# Patient Record
Sex: MALE | Race: AMERICAN INDIAN OR ALASKA NATIVE
[De-identification: names, ages, dates, MRNs, and addresses within clinical notes are randomized per-mention and may not be internally consistent; named-entity substitution may affect disease eponyms.]

---

## 2018-03-26 ENCOUNTER — HOSPITAL ENCOUNTER (EMERGENCY)
Dept: HOSPITAL 5 - ED | Age: 33
Discharge: HOME | End: 2018-03-26
Payer: SELF-PAY

## 2018-03-26 VITALS — DIASTOLIC BLOOD PRESSURE: 84 MMHG | SYSTOLIC BLOOD PRESSURE: 135 MMHG

## 2018-03-26 DIAGNOSIS — K05.10: ICD-10-CM

## 2018-03-26 DIAGNOSIS — G89.29: ICD-10-CM

## 2018-03-26 DIAGNOSIS — F17.200: ICD-10-CM

## 2018-03-26 DIAGNOSIS — K02.9: Primary | ICD-10-CM

## 2018-03-26 PROCEDURE — 99282 EMERGENCY DEPT VISIT SF MDM: CPT

## 2018-03-26 PROCEDURE — 96372 THER/PROPH/DIAG INJ SC/IM: CPT

## 2018-03-26 NOTE — EMERGENCY DEPARTMENT REPORT
ED ENT HPI





- General


Chief complaint: Dental/Oral


Stated complaint: MOUTH PAIN SWELLING


Time Seen by Provider: 03/26/18 17:45


Source: patient


Mode of arrival: Ambulatory


Limitations: No Limitations





- History of Present Illness


Initial comments: 





This is a 33-year-old male nontoxic, well nourished in appearance, no acute 

signs of distress presents to the ED with c/o of acute on chronic toothache x1 

week. Patient denies any trauma to the region.  Patient is rest pain as aching 

with level of 8 out of 10.  Patient denies any facial swelling.  Patient denies 

any numbness, tingling, fever, chills, nausea, vomiting, headache or stiff 

neck.  Patient denies any drug allergies or significant past medical history.


MD complaint: tooth pain


-: week(s) (1)


Location: tooth #





  __________________________














  __________________________





 1 - Toothache





Severity: mild


Severity scale (0 -10): 8


Quality: aching


Consistency: constant


Improves with: none


Worsens with: none


Context- Dental: history of dental caries, poor dental care


Associated Symptoms: gum swelling, toothache.  denies: fever, cough, pain with 

swallowing, sore throat, tinnitus, hearing loss, discharge from ear, rhinorrhea





- Related Data


 Previous Rx's











 Medication  Instructions  Recorded  Last Taken  Type


 


Amoxicillin/K Clav Tab [Augmentin 1 tab PO Q12HR #20 tab 03/26/18 Unknown Rx





875 mg]    


 


Chlorhexidine Mouthwash [Peridex] 15 ml MM BID #1 bottle 03/26/18 Unknown Rx


 


Ibuprofen [Motrin] 600 mg PO Q8H PRN #30 tablet 03/26/18 Unknown Rx


 


traMADol [Ultram] 50 mg PO Q6HR PRN #15 tablet 03/26/18 Unknown Rx











 Allergies











Allergy/AdvReac Type Severity Reaction Status Date / Time


 


No Known Allergies Allergy   Unverified 03/26/18 15:29














ED Dental HPI





- General


Chief complaint: Dental/Oral


Stated complaint: MOUTH PAIN SWELLING


Time Seen by Provider: 03/26/18 17:45


Source: patient


Mode of arrival: Ambulatory


Limitations: No Limitations





- Related Data


 Previous Rx's











 Medication  Instructions  Recorded  Last Taken  Type


 


Amoxicillin/K Clav Tab [Augmentin 1 tab PO Q12HR #20 tab 03/26/18 Unknown Rx





875 mg]    


 


Chlorhexidine Mouthwash [Peridex] 15 ml MM BID #1 bottle 03/26/18 Unknown Rx


 


Ibuprofen [Motrin] 600 mg PO Q8H PRN #30 tablet 03/26/18 Unknown Rx


 


traMADol [Ultram] 50 mg PO Q6HR PRN #15 tablet 03/26/18 Unknown Rx











 Allergies











Allergy/AdvReac Type Severity Reaction Status Date / Time


 


No Known Allergies Allergy   Unverified 03/26/18 15:29














ED Review of Systems


ROS: 


Stated complaint: MOUTH PAIN SWELLING


Other details as noted in HPI





Constitutional: denies: chills, fever


Eyes: denies: eye pain, eye discharge, vision change


ENT: dental pain.  denies: ear pain, throat pain


Respiratory: denies: cough, shortness of breath, wheezing


Cardiovascular: denies: chest pain, palpitations


Endocrine: no symptoms reported


Gastrointestinal: denies: abdominal pain, nausea, diarrhea


Genitourinary: denies: urgency, dysuria


Musculoskeletal: denies: back pain, joint swelling, arthralgia


Skin: denies: rash, lesions


Neurological: denies: headache, weakness, paresthesias


Psychiatric: denies: anxiety, depression


Hematological/Lymphatic: denies: easy bleeding, easy bruising





ED Past Medical Hx





- Past Medical History


Previous Medical History?: No





- Surgical History


Past Surgical History?: No





- Social History


Smoking Status: Current Every Day Smoker


Substance Use Type: Alcohol, Non Opiate Pain





- Medications


Home Medications: 


 Home Medications











 Medication  Instructions  Recorded  Confirmed  Last Taken  Type


 


Amoxicillin/K Clav Tab [Augmentin 1 tab PO Q12HR #20 tab 03/26/18  Unknown Rx





875 mg]     


 


Chlorhexidine Mouthwash [Peridex] 15 ml MM BID #1 bottle 03/26/18  Unknown Rx


 


Ibuprofen [Motrin] 600 mg PO Q8H PRN #30 tablet 03/26/18  Unknown Rx


 


traMADol [Ultram] 50 mg PO Q6HR PRN #15 tablet 03/26/18  Unknown Rx














ED Physical Exam





- General


Limitations: No Limitations


General appearance: alert, in no apparent distress





- Head


Head exam: Present: atraumatic, normocephalic





- Eye


Eye exam: Present: normal appearance


Pupils: Present: normal accommodation





- ENT


ENT exam: Present: mucous membranes moist, TM's normal bilaterally, normal 

external ear exam





- Expanded ENT Exam


  ** Expanded


Ear exam: Present: normal external inspection


Mouth exam: Present: normal external inspection, tongue normal.  Absent: 

drooling, trismus, muffled voice, tongue elevation, laceration


Teeth exam: Present: dental caries, fractured tooth #, dental tenderness #, 

gingival enlargement, other (no facial swelling or abscess noted.)





  __________________________














  __________________________





 1 - Fractured, Dental Tenderness





Throat exam: Positive: normal inspection, other (uvula midline).  Negative: 

tonsillar erythema, tonsillomegaly, tonsillar exudate, R peritonsillar mass, L 

peritonsillar mass





- Neck


Neck exam: Present: normal inspection, full ROM.  Absent: tenderness, 

meningismus, lymphadenopathy, thyromegaly





- Respiratory


Respiratory exam: Present: normal lung sounds bilaterally.  Absent: respiratory 

distress, wheezes, rales, rhonchi, stridor, chest wall tenderness, accessory 

muscle use, decreased breath sounds, prolonged expiratory





- Cardiovascular


Cardiovascular Exam: Present: regular rate, normal rhythm, normal heart sounds.

  Absent: bradycardia, tachycardia, irregular rhythm, systolic murmur, 

diastolic murmur, rubs, gallop





- GI/Abdominal


GI/Abdominal exam: Present: soft, normal bowel sounds





- Rectal


Rectal exam: Present: deferred





- Extremities Exam


Extremities exam: Present: normal inspection





- Back Exam


Back exam: Present: normal inspection





- Neurological Exam


Neurological exam: Present: alert, oriented X3, normal gait





- Psychiatric


Psychiatric exam: Present: normal affect, normal mood





- Skin


Skin exam: Present: warm, dry, intact, normal color.  Absent: rash





ED Course


 Vital Signs











  03/26/18 03/26/18 03/26/18





  15:29 18:00 18:30


 


Temperature 99 F  


 


Pulse Rate 88  


 


Respiratory 20 16 16





Rate   


 


Blood Pressure 135/84  


 


O2 Sat by Pulse 100  





Oximetry   














- Reevaluation(s)


Reevaluation #1: 





03/26/18 17:54


Patient is speaking in full sentences with no signs of distress noted.


Critical care attestation.: 


If time is entered above; I have spent that time in minutes in the direct care 

of this critically ill patient, excluding procedure time.








ED Disposition


Clinical Impression: 


 Dental caries, Gingivitis





Disposition: DC-01 TO HOME OR SELFCARE


Is pt being admited?: No


Does the pt Need Aspirin: No


Condition: Stable


Instructions:  Amoxicillin/Clavulanate Potassium (By mouth), Tramadol (By mouth)

, Dental Caries (ED), Gingivitis (ED)


Additional Instructions: 


Follow-up with a dentist in 3-5 days or if symptoms worsen and continue return 

to emergency room as soon as possible. 


Prescriptions: 


Amoxicillin/K Clav Tab [Augmentin 875 mg] 1 tab PO Q12HR #20 tab


Chlorhexidine Mouthwash [Peridex] 15 ml MM BID #1 bottle


Ibuprofen [Motrin] 600 mg PO Q8H PRN #30 tablet


 PRN Reason: Pain


traMADol [Ultram] 50 mg PO Q6HR PRN #15 tablet


 PRN Reason: Pain


Referrals: 


PRIMARY CARE,MD [Primary Care Provider] - 3-5 Days


LORI RINCON MD [Staff Physician] - 3-5 Days


[facility] [Outside] - 3-5 Days


Forms:  Work/School Release Form(ED)